# Patient Record
Sex: FEMALE | URBAN - METROPOLITAN AREA
[De-identification: names, ages, dates, MRNs, and addresses within clinical notes are randomized per-mention and may not be internally consistent; named-entity substitution may affect disease eponyms.]

---

## 2023-12-12 ENCOUNTER — APPOINTMENT (OUTPATIENT)
Dept: OBSTETRICS AND GYNECOLOGY | Facility: CLINIC | Age: 28
End: 2023-12-12

## 2025-03-28 ENCOUNTER — OFFICE VISIT (OUTPATIENT)
Dept: URGENT CARE | Facility: URGENT CARE | Age: 30
End: 2025-03-28
Payer: COMMERCIAL

## 2025-03-28 VITALS
BODY MASS INDEX: 42.91 KG/M2 | HEART RATE: 83 BPM | DIASTOLIC BLOOD PRESSURE: 84 MMHG | HEIGHT: 67 IN | WEIGHT: 273.37 LBS | OXYGEN SATURATION: 99 % | SYSTOLIC BLOOD PRESSURE: 133 MMHG | TEMPERATURE: 97.6 F | RESPIRATION RATE: 16 BRPM

## 2025-03-28 DIAGNOSIS — M62.830 SPASM OF LUMBAR PARASPINOUS MUSCLE: ICD-10-CM

## 2025-03-28 DIAGNOSIS — M54.50 ACUTE BILATERAL LOW BACK PAIN WITHOUT SCIATICA: Primary | ICD-10-CM

## 2025-03-28 DIAGNOSIS — S39.012A ACUTE MYOFASCIAL STRAIN OF LUMBAR REGION, INITIAL ENCOUNTER: ICD-10-CM

## 2025-03-28 RX ORDER — PREDNISONE 20 MG/1
TABLET ORAL
Qty: 20 TABLET | Refills: 0 | Status: SHIPPED | OUTPATIENT
Start: 2025-03-28

## 2025-03-28 RX ORDER — CYCLOBENZAPRINE HCL 10 MG
10 TABLET ORAL 3 TIMES DAILY PRN
Qty: 30 TABLET | Refills: 0 | Status: SHIPPED | OUTPATIENT
Start: 2025-03-28

## 2025-03-28 ASSESSMENT — PAIN SCALES - GENERAL: PAINLEVEL_OUTOF10: 8

## 2025-03-28 ASSESSMENT — ENCOUNTER SYMPTOMS
WOUND: 0
BACK PAIN: 1
CHEST TIGHTNESS: 0
DYSURIA: 0
HEADACHES: 0
ABDOMINAL PAIN: 0
WHEEZING: 0
VOMITING: 0
JOINT SWELLING: 0
SHORTNESS OF BREATH: 0
DIARRHEA: 0
CONSTIPATION: 0
CHILLS: 0
NAUSEA: 0
MYALGIAS: 1
NECK STIFFNESS: 0
FREQUENCY: 0
SORE THROAT: 0
PALPITATIONS: 0
FEVER: 0
RHINORRHEA: 0

## 2025-03-28 NOTE — PATIENT INSTRUCTIONS
You have a lumbar sprain and low back pain  Please take prednisone as a single dose early in the day with food  May use Flexeril for muscle spasm. This medication may cause drowsiness. I recommend that you try Flexeril at the time when you are not doing tasks requiring alertness such as driving or operating machinery to see how it effects your alertness. Based on how drowsy it makes you, may have to limit its use to evening and bedtime hours only.  May use acetaminophen 325 mg, 2 tablets by mouth every 4-6 hours as needed for discomfort  May apply cold compresses to affected area 15-20 minutes 2 or 3 times daily for the next 3-5 days  If no better in 5-7 days please follow-up with primary care provider   If you have numbness, weakness, tingling in lower extremities or numbness or tingling in pelvic or rectal area or loss of bladder or bowel control please go to immediately to emergency department for further evaluation of this problem  This note was generated by voice recognition software. Minor transcription/grammatical errors may be present. Please call for clarification.

## 2025-03-28 NOTE — PROGRESS NOTES
Subjective   Patient ID: Carissa Chaparro is a 29 y.o. female.    HPI: 29-year-old female presents with a complaint of progressively worsening lower back pain over the past 2 weeks.  She states she took some Naprosyn today and had slight relief.  She reports that normally naproxen would relieve her symptoms plaint completely.      History provided by:  Patient   used: No        The following portions of the chart were reviewed this encounter and updated as appropriate:  Tobacco  Allergies  Meds  Problems  Med Hx  Surg Hx  Fam Hx         Review of Systems   Constitutional:  Negative for chills and fever.   HENT:  Negative for congestion, ear pain, rhinorrhea and sore throat.    Respiratory:  Negative for chest tightness, shortness of breath and wheezing.    Cardiovascular:  Negative for palpitations.   Gastrointestinal:  Negative for abdominal pain, constipation, diarrhea, nausea and vomiting.   Genitourinary:  Negative for dysuria and frequency.   Musculoskeletal:  Positive for back pain and myalgias. Negative for joint swelling and neck stiffness.   Skin:  Negative for wound.   Neurological:  Negative for headaches.     Objective   Physical Exam  Vital signs are reviewed. Alert and oriented x3 with normal mood and affect  Patient is well nourished, well-developed, alert and in no acute distress  Denies pain to palpation over frontal, ethmoid or maxillary sinus areas    External eyes, orbits, conjunctiva and eyelids are normal in appearance  Pupils are equal, round, reactive to light and accommodation, extraocular movements intact    External ears appear normal  External canals are normal in appearance  Right tympanic membrane is intact and pale gray in appearance  Left tympanic membrane is intact and pearly gray in appearance  There is no middle ear effusion noted on the right  There is no middle ear effusion noted on the left  External appearance of the nose is normal  Nasal mucosa,  septum, turbinates are pink in appearance  There is scant thin white nasal discharge in both nares    Oral mucosa is uniformly pink and moist  Palate is pink, symmetric and intact  Tongue is moist, mobile and midline  Steer pharynx not erythematous with no concretions or exudates present  No cervical lymphadenopathy palpated    Heart has regular rate and rhythm. No murmurs, rubs or gallops are auscultated at this exam.    Respiratory rate rhythm and effort are normal. Breath sounds bilaterally are clear on auscultation without crackles, rhonchi, wheezes or friction rub.    Abdomen: Normal bowel sounds on auscultation. Soft, nontender without   rebound or rigidity on palpation    Musculoskeletal: Patient denies pain to palpation over the posterior spinous processes of the lower thoracic and lumbar vertebrae.  She has tenderness to palpation at the superior pole of the sacroiliac joints bilaterally.  There is tenderness along the course of the ileal lumbar and ilioinguinal ligaments with muscle spasm palpable in the paraspinal muscle cords of the lumbar spine.  There is also significant tissue texture change and muscle spasm in the left hip for this and quadratus lumborum.  There are tender points as well as obvious tissue texture change compatible with ropey muscle spasms.  Patient complains of discomfort with palpation over these areas.  Denies any pain with palpation of the sciatic trunks through the gluteus muscles and down into the deep hip sockets under the piriformis.  No cutaneous pain on the lateral or anterior aspects of the thighs.  Procedures    Assessment/Plan   Diagnoses and all orders for this visit:  Acute bilateral low back pain without sciatica  -     predniSONE (Deltasone) 20 mg tablet; 3 tabs p.o. daily x3, 2 tabs  p.o. daily x3, 1 tab p.o. daily x3, one half tab p.o. daily x4  Acute myofascial strain of lumbar region, initial encounter  -     predniSONE (Deltasone) 20 mg tablet; 3 tabs p.o. daily  x3, 2 tabs  p.o. daily x3, 1 tab p.o. daily x3, one half tab p.o. daily x4  -     cyclobenzaprine (Flexeril) 10 mg tablet; Take 1 tablet (10 mg) by mouth 3 times a day as needed for muscle spasms for up to 30 doses.  Spasm of lumbar paraspinous muscle  -     predniSONE (Deltasone) 20 mg tablet; 3 tabs p.o. daily x3, 2 tabs  p.o. daily x3, 1 tab p.o. daily x3, one half tab p.o. daily x4  -     cyclobenzaprine (Flexeril) 10 mg tablet; Take 1 tablet (10 mg) by mouth 3 times a day as needed for muscle spasms for up to 30 doses.    Patient disposition: Home

## 2025-08-13 ENCOUNTER — APPOINTMENT (OUTPATIENT)
Dept: PRIMARY CARE | Facility: CLINIC | Age: 30
End: 2025-08-13

## 2025-08-28 ENCOUNTER — APPOINTMENT (OUTPATIENT)
Dept: PRIMARY CARE | Facility: CLINIC | Age: 30
End: 2025-08-28
Payer: COMMERCIAL

## 2025-08-28 VITALS
BODY MASS INDEX: 39.87 KG/M2 | SYSTOLIC BLOOD PRESSURE: 138 MMHG | HEART RATE: 93 BPM | WEIGHT: 254 LBS | HEIGHT: 67 IN | DIASTOLIC BLOOD PRESSURE: 80 MMHG | OXYGEN SATURATION: 99 %

## 2025-08-28 DIAGNOSIS — Z11.7 ENCOUNTER FOR TESTING FOR LATENT TUBERCULOSIS: ICD-10-CM

## 2025-08-28 DIAGNOSIS — Z13.220 SCREENING, LIPID: ICD-10-CM

## 2025-08-28 DIAGNOSIS — Z00.00 ADULT WELLNESS VISIT: Primary | ICD-10-CM

## 2025-08-28 DIAGNOSIS — Z80.41 FAMILY HISTORY OF OVARIAN CANCER: ICD-10-CM

## 2025-08-28 DIAGNOSIS — Z80.42 FAMILY HX OF PROSTATE CANCER: ICD-10-CM

## 2025-08-28 DIAGNOSIS — Z80.0 FAMILY HISTORY OF PANCREATIC CANCER: ICD-10-CM

## 2025-08-28 DIAGNOSIS — Z13.29 SCREENING FOR THYROID DISORDER: ICD-10-CM

## 2025-08-28 DIAGNOSIS — Z01.84 IMMUNITY STATUS TESTING: ICD-10-CM

## 2025-08-28 PROCEDURE — 1036F TOBACCO NON-USER: CPT | Performed by: NURSE PRACTITIONER

## 2025-08-28 PROCEDURE — 3008F BODY MASS INDEX DOCD: CPT | Performed by: NURSE PRACTITIONER

## 2025-08-28 PROCEDURE — 99395 PREV VISIT EST AGE 18-39: CPT | Performed by: NURSE PRACTITIONER

## 2025-08-28 ASSESSMENT — ENCOUNTER SYMPTOMS
CARDIOVASCULAR NEGATIVE: 1
BACK PAIN: 1
DYSPHORIC MOOD: 0
RESPIRATORY NEGATIVE: 1
GASTROINTESTINAL NEGATIVE: 1
NEUROLOGICAL NEGATIVE: 1
SLEEP DISTURBANCE: 0
NERVOUS/ANXIOUS: 0

## 2025-09-05 LAB
ALBUMIN SERPL-MCNC: 4.3 G/DL (ref 3.6–5.1)
ALBUMIN/CREAT UR: 4 MG/G CREAT
ALP SERPL-CCNC: 60 U/L (ref 31–125)
ALT SERPL-CCNC: 21 U/L (ref 6–29)
ANION GAP SERPL CALCULATED.4IONS-SCNC: 8 MMOL/L (CALC) (ref 7–17)
AST SERPL-CCNC: 16 U/L (ref 10–30)
BASOPHILS # BLD AUTO: 72 CELLS/UL (ref 0–200)
BASOPHILS NFR BLD AUTO: 0.8 %
BILIRUB SERPL-MCNC: 0.5 MG/DL (ref 0.2–1.2)
BUN SERPL-MCNC: 20 MG/DL (ref 7–25)
CALCIUM SERPL-MCNC: 9.2 MG/DL (ref 8.6–10.2)
CHLORIDE SERPL-SCNC: 107 MMOL/L (ref 98–110)
CHOLEST SERPL-MCNC: 129 MG/DL
CHOLEST/HDLC SERPL: 2.9 (CALC)
CO2 SERPL-SCNC: 24 MMOL/L (ref 20–32)
CREAT SERPL-MCNC: 0.74 MG/DL (ref 0.5–0.97)
CREAT UR-MCNC: 131 MG/DL (ref 20–275)
EGFRCR SERPLBLD CKD-EPI 2021: 112 ML/MIN/1.73M2
EOSINOPHIL # BLD AUTO: 234 CELLS/UL (ref 15–500)
EOSINOPHIL NFR BLD AUTO: 2.6 %
ERYTHROCYTE [DISTWIDTH] IN BLOOD BY AUTOMATED COUNT: 14.6 % (ref 11–15)
GLUCOSE SERPL-MCNC: 76 MG/DL (ref 65–99)
HBV SURFACE AB SERPL IA-ACNC: <5 MIU/ML
HCT VFR BLD AUTO: 37.1 % (ref 35–45)
HDLC SERPL-MCNC: 44 MG/DL
HGB BLD-MCNC: 11.7 G/DL (ref 11.7–15.5)
IGNF NEG CNTRL BLD: NORMAL
LDLC SERPL CALC-MCNC: 69 MG/DL (CALC)
LYMPHOCYTES # BLD AUTO: 2115 CELLS/UL (ref 850–3900)
LYMPHOCYTES NFR BLD AUTO: 23.5 %
M TB IFN-G BLD-IMP: NEGATIVE
MCH RBC QN AUTO: 26 PG (ref 27–33)
MCHC RBC AUTO-ENTMCNC: 31.5 G/DL (ref 32–36)
MCV RBC AUTO: 82.4 FL (ref 80–100)
MEV IGG SER IA-ACNC: 293 AU/ML
MICROALBUMIN UR-MCNC: 0.5 MG/DL
MITOGEN IGNF.SPOT COUNT BLD: NORMAL
MONOCYTES # BLD AUTO: 792 CELLS/UL (ref 200–950)
MONOCYTES NFR BLD AUTO: 8.8 %
MUV IGG SER IA-ACNC: 34.4 AU/ML
NEUTROPHILS # BLD AUTO: 5787 CELLS/UL (ref 1500–7800)
NEUTROPHILS NFR BLD AUTO: 64.3 %
NONHDLC SERPL-MCNC: 85 MG/DL (CALC)
PLATELET # BLD AUTO: 220 THOUSAND/UL (ref 140–400)
PMV BLD REES-ECKER: 11.3 FL (ref 7.5–12.5)
POTASSIUM SERPL-SCNC: 3.6 MMOL/L (ref 3.5–5.3)
PROT SERPL-MCNC: 6.7 G/DL (ref 6.1–8.1)
QUEST PANEL A SPOT COUNT: 0
QUEST PANEL B SPOT COUNT: 4
RBC # BLD AUTO: 4.5 MILLION/UL (ref 3.8–5.1)
RUBV IGG SERPL IA-ACNC: 6.11 INDEX
SODIUM SERPL-SCNC: 139 MMOL/L (ref 135–146)
TRIGL SERPL-MCNC: 79 MG/DL
TSH SERPL-ACNC: 1.71 MIU/L
VZV IGG SER IA-ACNC: 13 S/CO
WBC # BLD AUTO: 9 THOUSAND/UL (ref 3.8–10.8)

## 2025-09-19 ENCOUNTER — APPOINTMENT (OUTPATIENT)
Dept: OBSTETRICS AND GYNECOLOGY | Facility: CLINIC | Age: 30
End: 2025-09-19
Payer: COMMERCIAL

## 2025-11-10 ENCOUNTER — APPOINTMENT (OUTPATIENT)
Facility: CLINIC | Age: 30
End: 2025-11-10
Payer: COMMERCIAL

## 2026-09-03 ENCOUNTER — APPOINTMENT (OUTPATIENT)
Dept: PRIMARY CARE | Facility: CLINIC | Age: 31
End: 2026-09-03
Payer: COMMERCIAL